# Patient Record
Sex: FEMALE | Race: WHITE | NOT HISPANIC OR LATINO | Employment: FULL TIME | URBAN - METROPOLITAN AREA
[De-identification: names, ages, dates, MRNs, and addresses within clinical notes are randomized per-mention and may not be internally consistent; named-entity substitution may affect disease eponyms.]

---

## 2017-06-06 PROBLEM — Q51.810 ARCUATE UTERUS: Status: ACTIVE | Noted: 2017-06-06

## 2017-12-15 ENCOUNTER — APPOINTMENT (OUTPATIENT)
Dept: CT IMAGING | Facility: HOSPITAL | Age: 37
End: 2017-12-15

## 2017-12-15 ENCOUNTER — HOSPITAL ENCOUNTER (EMERGENCY)
Facility: HOSPITAL | Age: 37
Discharge: HOME | End: 2017-12-15
Attending: EMERGENCY MEDICINE | Admitting: NURSE PRACTITIONER

## 2017-12-15 VITALS
TEMPERATURE: 97.4 F | WEIGHT: 101 LBS | HEART RATE: 79 BPM | HEIGHT: 65 IN | OXYGEN SATURATION: 96 % | BODY MASS INDEX: 16.83 KG/M2 | SYSTOLIC BLOOD PRESSURE: 143 MMHG | RESPIRATION RATE: 16 BRPM | DIASTOLIC BLOOD PRESSURE: 87 MMHG

## 2017-12-15 DIAGNOSIS — N83.202 CYST OF LEFT OVARY: Primary | ICD-10-CM

## 2017-12-15 DIAGNOSIS — R19.00 PELVIC MASS: ICD-10-CM

## 2017-12-15 DIAGNOSIS — R10.9 ABDOMINAL PAIN, UNSPECIFIED ABDOMINAL LOCATION: ICD-10-CM

## 2017-12-15 LAB
ALBUMIN SERPL-MCNC: 4.1 G/DL (ref 3.2–4.8)
ALBUMIN/GLOB SERPL: 1.5 G/DL (ref 1.5–2.5)
ALP SERPL-CCNC: 64 U/L (ref 25–100)
ALT SERPL W P-5'-P-CCNC: 25 U/L (ref 7–40)
ANION GAP SERPL CALCULATED.3IONS-SCNC: 1 MMOL/L (ref 3–11)
AST SERPL-CCNC: 30 U/L (ref 0–33)
B-HCG UR QL: NEGATIVE
BASOPHILS # BLD AUTO: 0.02 10*3/MM3 (ref 0–0.2)
BASOPHILS NFR BLD AUTO: 0.3 % (ref 0–1)
BILIRUB SERPL-MCNC: 0.7 MG/DL (ref 0.3–1.2)
BILIRUB UR QL STRIP: NEGATIVE
BUN BLD-MCNC: 14 MG/DL (ref 9–23)
BUN/CREAT SERPL: 17.5 (ref 7–25)
CALCIUM SPEC-SCNC: 9.2 MG/DL (ref 8.7–10.4)
CHLORIDE SERPL-SCNC: 111 MMOL/L (ref 99–109)
CLARITY UR: CLEAR
CO2 SERPL-SCNC: 27 MMOL/L (ref 20–31)
COLOR UR: YELLOW
CREAT BLD-MCNC: 0.8 MG/DL (ref 0.6–1.3)
DEPRECATED RDW RBC AUTO: 44.2 FL (ref 37–54)
EOSINOPHIL # BLD AUTO: 0.05 10*3/MM3 (ref 0–0.3)
EOSINOPHIL NFR BLD AUTO: 0.7 % (ref 0–3)
ERYTHROCYTE [DISTWIDTH] IN BLOOD BY AUTOMATED COUNT: 12.7 % (ref 11.3–14.5)
GFR SERPL CREATININE-BSD FRML MDRD: 81 ML/MIN/1.73
GLOBULIN UR ELPH-MCNC: 2.7 GM/DL
GLUCOSE BLD-MCNC: 94 MG/DL (ref 70–100)
GLUCOSE UR STRIP-MCNC: NEGATIVE MG/DL
HCT VFR BLD AUTO: 40.2 % (ref 34.5–44)
HGB BLD-MCNC: 13.2 G/DL (ref 11.5–15.5)
HGB UR QL STRIP.AUTO: NEGATIVE
IMM GRANULOCYTES # BLD: 0.01 10*3/MM3 (ref 0–0.03)
IMM GRANULOCYTES NFR BLD: 0.1 % (ref 0–0.6)
KETONES UR QL STRIP: NEGATIVE
LEUKOCYTE ESTERASE UR QL STRIP.AUTO: NEGATIVE
LIPASE SERPL-CCNC: 52 U/L (ref 6–51)
LYMPHOCYTES # BLD AUTO: 1.2 10*3/MM3 (ref 0.6–4.8)
LYMPHOCYTES NFR BLD AUTO: 17.4 % (ref 24–44)
MCH RBC QN AUTO: 31.6 PG (ref 27–31)
MCHC RBC AUTO-ENTMCNC: 32.8 G/DL (ref 32–36)
MCV RBC AUTO: 96.2 FL (ref 80–99)
MONOCYTES # BLD AUTO: 0.71 10*3/MM3 (ref 0–1)
MONOCYTES NFR BLD AUTO: 10.3 % (ref 0–12)
NEUTROPHILS # BLD AUTO: 4.89 10*3/MM3 (ref 1.5–8.3)
NEUTROPHILS NFR BLD AUTO: 71.2 % (ref 41–71)
NITRITE UR QL STRIP: NEGATIVE
PH UR STRIP.AUTO: 8.5 [PH] (ref 5–8)
PLATELET # BLD AUTO: 148 10*3/MM3 (ref 150–450)
PMV BLD AUTO: 11.7 FL (ref 6–12)
POTASSIUM BLD-SCNC: 4.3 MMOL/L (ref 3.5–5.5)
PROT SERPL-MCNC: 6.8 G/DL (ref 5.7–8.2)
PROT UR QL STRIP: NEGATIVE
RBC # BLD AUTO: 4.18 10*6/MM3 (ref 3.89–5.14)
SODIUM BLD-SCNC: 139 MMOL/L (ref 132–146)
SP GR UR STRIP: 1.01 (ref 1–1.03)
UROBILINOGEN UR QL STRIP: ABNORMAL
WBC NRBC COR # BLD: 6.88 10*3/MM3 (ref 3.5–10.8)

## 2017-12-15 PROCEDURE — 81003 URINALYSIS AUTO W/O SCOPE: CPT

## 2017-12-15 PROCEDURE — 96374 THER/PROPH/DIAG INJ IV PUSH: CPT

## 2017-12-15 PROCEDURE — 81025 URINE PREGNANCY TEST: CPT | Performed by: EMERGENCY MEDICINE

## 2017-12-15 PROCEDURE — 99284 EMERGENCY DEPT VISIT MOD MDM: CPT

## 2017-12-15 PROCEDURE — 96375 TX/PRO/DX INJ NEW DRUG ADDON: CPT

## 2017-12-15 PROCEDURE — 25010000002 ONDANSETRON PER 1 MG: Performed by: NURSE PRACTITIONER

## 2017-12-15 PROCEDURE — 0 DIATRIZOATE MEGLUMINE & SODIUM PER 1 ML: Performed by: NURSE PRACTITIONER

## 2017-12-15 PROCEDURE — 0 IOPAMIDOL 61 % SOLUTION: Performed by: EMERGENCY MEDICINE

## 2017-12-15 PROCEDURE — 51798 US URINE CAPACITY MEASURE: CPT

## 2017-12-15 PROCEDURE — 96361 HYDRATE IV INFUSION ADD-ON: CPT

## 2017-12-15 PROCEDURE — 80053 COMPREHEN METABOLIC PANEL: CPT | Performed by: NURSE PRACTITIONER

## 2017-12-15 PROCEDURE — 83690 ASSAY OF LIPASE: CPT | Performed by: NURSE PRACTITIONER

## 2017-12-15 PROCEDURE — 74177 CT ABD & PELVIS W/CONTRAST: CPT

## 2017-12-15 PROCEDURE — 25010000002 KETOROLAC TROMETHAMINE PER 15 MG: Performed by: NURSE PRACTITIONER

## 2017-12-15 PROCEDURE — 85025 COMPLETE CBC W/AUTO DIFF WBC: CPT | Performed by: NURSE PRACTITIONER

## 2017-12-15 RX ORDER — ONDANSETRON 2 MG/ML
4 INJECTION INTRAMUSCULAR; INTRAVENOUS ONCE
Status: COMPLETED | OUTPATIENT
Start: 2017-12-15 | End: 2017-12-15

## 2017-12-15 RX ORDER — HYDROCODONE BITARTRATE AND ACETAMINOPHEN 5; 325 MG/1; MG/1
1-2 TABLET ORAL EVERY 6 HOURS PRN
Qty: 15 TABLET | Refills: 0 | Status: SHIPPED | OUTPATIENT
Start: 2017-12-15 | End: 2018-01-05

## 2017-12-15 RX ORDER — KETOROLAC TROMETHAMINE 15 MG/ML
15 INJECTION, SOLUTION INTRAMUSCULAR; INTRAVENOUS ONCE
Status: COMPLETED | OUTPATIENT
Start: 2017-12-15 | End: 2017-12-15

## 2017-12-15 RX ORDER — SODIUM CHLORIDE 0.9 % (FLUSH) 0.9 %
10 SYRINGE (ML) INJECTION AS NEEDED
Status: DISCONTINUED | OUTPATIENT
Start: 2017-12-15 | End: 2017-12-15 | Stop reason: HOSPADM

## 2017-12-15 RX ORDER — ONDANSETRON 4 MG/1
4 TABLET, FILM COATED ORAL EVERY 6 HOURS PRN
Qty: 12 TABLET | Refills: 0 | Status: SHIPPED | OUTPATIENT
Start: 2017-12-15 | End: 2018-01-05

## 2017-12-15 RX ADMIN — Medication 10 ML: at 11:05

## 2017-12-15 RX ADMIN — SODIUM CHLORIDE 1000 ML: 9 INJECTION, SOLUTION INTRAVENOUS at 11:17

## 2017-12-15 RX ADMIN — DIATRIZOATE MEGLUMINE AND DIATRIZOATE SODIUM 15 ML: 660; 100 LIQUID ORAL; RECTAL at 11:17

## 2017-12-15 RX ADMIN — KETOROLAC TROMETHAMINE 15 MG: 15 INJECTION, SOLUTION INTRAMUSCULAR; INTRAVENOUS at 11:18

## 2017-12-15 RX ADMIN — ONDANSETRON 4 MG: 2 INJECTION INTRAMUSCULAR; INTRAVENOUS at 11:18

## 2017-12-15 RX ADMIN — IOPAMIDOL 80 ML: 612 INJECTION, SOLUTION INTRAVENOUS at 12:53

## 2017-12-15 NOTE — ED PROVIDER NOTES
Subjective   HPI Comments: Lower abd pain x 3 days.    Hx of ovarian cysts 5 years ago.    Has had some nausea and vomiting.     Has been waxing and waning.    Patient is a 37 y.o. female presenting with abdominal pain.   Abdominal Pain   Pain location:  Suprapubic  Pain quality: aching    Pain radiates to:  Does not radiate  Pain severity:  Moderate  Onset quality:  Gradual  Duration:  3 days  Timing:  Constant  Progression:  Waxing and waning  Chronicity:  New  Relieved by:  Nothing  Worsened by:  Position changes  Ineffective treatments:  None tried  Associated symptoms: nausea and vomiting    Associated symptoms: no chest pain, no diarrhea and no vaginal bleeding        Review of Systems   Cardiovascular: Negative for chest pain.   Gastrointestinal: Positive for abdominal pain, nausea and vomiting. Negative for diarrhea.   Genitourinary: Negative for vaginal bleeding.   All other systems reviewed and are negative.      Past Medical History:   Diagnosis Date   • Arcuate uterus        No Known Allergies    History reviewed. No pertinent surgical history.    History reviewed. No pertinent family history.    Social History     Social History   • Marital status: Single     Spouse name: N/A   • Number of children: N/A   • Years of education: N/A     Social History Main Topics   • Smoking status: Current Every Day Smoker     Packs/day: 0.50   • Smokeless tobacco: None   • Alcohol use No   • Drug use: No   • Sexual activity: Not Asked     Other Topics Concern   • None     Social History Narrative   • None           Objective   Physical Exam   Constitutional: She is oriented to person, place, and time. She appears well-developed and well-nourished.   HENT:   Head: Normocephalic and atraumatic.   Right Ear: External ear normal.   Left Ear: External ear normal.   Nose: Nose normal.   Mouth/Throat: Oropharynx is clear and moist.   Eyes: Conjunctivae and EOM are normal. Pupils are equal, round, and reactive to light.   Neck:  Normal range of motion. Neck supple.   Cardiovascular: Normal rate, regular rhythm, normal heart sounds and intact distal pulses.    Pulmonary/Chest: Effort normal and breath sounds normal.   Abdominal: Soft. Bowel sounds are normal. There is tenderness.   Musculoskeletal: Normal range of motion.   Neurological: She is alert and oriented to person, place, and time.   Skin: Skin is warm and dry.   Psychiatric: She has a normal mood and affect. Her behavior is normal. Judgment and thought content normal.       Procedures         ED Course  ED Course   Comment By Time   I spoke with Dr. Manning and he reviewed todays CT and his records. Will see her in the clinic.     Pt aware of the importance of fu. All thankful and agree. She will see this week.    Bladder large on the CT post void about 200ml. Pain much better after tordol. Aleksey Pearson, OMER 12/15 1459                  [facility]    Final diagnoses:   Cyst of left ovary   Abdominal pain, unspecified abdominal location   Pelvic mass            Aleksey Pearson, OMER  12/15/17 [id number]

## 2017-12-19 ENCOUNTER — TELEPHONE (OUTPATIENT)
Dept: OBSTETRICS AND GYNECOLOGY | Facility: CLINIC | Age: 37
End: 2017-12-19

## 2017-12-19 NOTE — TELEPHONE ENCOUNTER
Patient was seen in hospital for a cyst and they advised should see her doctor and have taken care of ?  Patient isn't sure what they are asking her to do.    Please call her and see what needs to be done - patient is in pain and wants to know.

## 2017-12-20 ENCOUNTER — TELEPHONE (OUTPATIENT)
Dept: OBSTETRICS AND GYNECOLOGY | Facility: CLINIC | Age: 37
End: 2017-12-20

## 2017-12-20 NOTE — TELEPHONE ENCOUNTER
Dr Floyd Patient    Is calling to talk to a nurse about the cyst on left in pelvic size 9cm like half her stomach.. She was ask to get it drained as soon as possible.

## 2017-12-20 NOTE — TELEPHONE ENCOUNTER
Pt was needing to get in to be seen soon as possible, explain to Pt it would be best to see another provider to get her in soon, due to Dr.Yo yañez being out and full schedule for up coming week. Pt call was forward to the front

## 2017-12-27 ENCOUNTER — TELEPHONE (OUTPATIENT)
Dept: OBSTETRICS AND GYNECOLOGY | Facility: CLINIC | Age: 37
End: 2017-12-27

## 2017-12-30 PROBLEM — Z01.419 WELL WOMAN EXAM: Status: ACTIVE | Noted: 2017-12-30

## 2018-01-05 ENCOUNTER — TELEPHONE (OUTPATIENT)
Dept: OBSTETRICS AND GYNECOLOGY | Facility: CLINIC | Age: 38
End: 2018-01-05

## 2018-01-05 ENCOUNTER — OFFICE VISIT (OUTPATIENT)
Dept: OBSTETRICS AND GYNECOLOGY | Facility: CLINIC | Age: 38
End: 2018-01-05

## 2018-01-05 VITALS
DIASTOLIC BLOOD PRESSURE: 72 MMHG | WEIGHT: 107 LBS | RESPIRATION RATE: 14 BRPM | BODY MASS INDEX: 17.81 KG/M2 | SYSTOLIC BLOOD PRESSURE: 116 MMHG

## 2018-01-05 DIAGNOSIS — N83.202 CYST OF LEFT OVARY: Primary | ICD-10-CM

## 2018-01-05 PROCEDURE — 99204 OFFICE O/P NEW MOD 45 MIN: CPT | Performed by: OBSTETRICS & GYNECOLOGY

## 2018-01-05 NOTE — PROGRESS NOTES
Subjective   Chief Complaint   Patient presents with   • Ovarian Cyst     f/u from Er visit     Pinky Dykes is a 37 y.o. year old .  Patient's last menstrual period was 2017 (approximate).  She presents to be seen because of Left lower quadrant pain worse for the last 4-6 weeks.  She has a long-standing history of a left ovarian cyst.  The pain can last all day.  The pain can radiate into the back at times.  It does not radiate into the vagina or the leg.  She takes at ×800 mg of ibuprofen every 4 hours to help with the pain.  Heating pad does tend to help the pain somewhat.  There has been associated chills at times.  She does have some nausea and vomiting at times.  Constipation is not an issue.  She's not been sexually active for several months.  Urinary symptoms are absent.    Activity makes the pain worse.  Resting helps it.  It is beginning to affect her ability to lift while at work.    She was seen at [facility] in the emergency room where a CAT scan was done.  The CAT scan demonstrated about a 9 cm left adnexal cyst    Over the last 6 months her cycles have been reasonably predictable.  She occasionally will bleed earlier than anticipated.  Cycles last about 7 days.  Flow can be heavy at times.    OTHER THINGS SHE WANTS TO DISCUSS TODAY:  Nothing else    The following portions of the patient's history were reviewed and updated as appropriate:current medications, allergies, past family history, past medical history, past social history and past surgical history    Smoking status: Current Every Day Smoker                                                   Packs/day: 0.50      Years: 0.00      Smokeless status: Never Used                        Review of Systems  Constitutional POS: nothing reported    NEG: anorexia or night sweats   Genitourinary POS: see HPI    NEG: dysuria or hematuria   Gastointestinal POS: see HPI    NEG: bloating, change in bowel habits, melena or reflux symptoms   Integument  POS: nothing reported    NEG: moles that are changing in size, shape, color or rashes   Breast POS: nothing reported    NEG: persistent breast lump, skin dimpling or nipple discharge         Objective   /72  Resp 14  Wt 48.5 kg (107 lb)  LMP 01/27/2017 (Approximate)  Breastfeeding? No  BMI 17.81 kg/m2    General:  well developed; well nourished  no acute distress   Abdomen: soft, non-tender; no masses  no umbilical or inginual hernias are present  no hepato-splenomegaly  non-surgical   Pelvis: Clinical staff was present for exam  External genitalia:  normal appearance of the external genitalia including Bartholin's and [place]'s glands.  :  urethral meatus normal;  Vaginal:  normal pink mucosa without prolapse or lesions.  Cervix:  normal appearance.  Uterus:  normal size, shape and consistency.  Adnexa:  Large left adnexal mass filling the pelvis displacing the uterus to the right  Rectal:  digital rectal exam not performed; anus visually normal appearing.     Lab Review   No data reviewed    Imaging   CT of abdomen/pelvis report        Assessment   1. Persistent large left adnexal cyst historically with benign sonographic appearance.  It is impacting her quality of life and additional workup is required     Plan   1. Ultrasound needs to be done ASAP.   2. The importance of keeping all planned follow-up and taking all medications as prescribed was emphasized.  3. Follow up for ultrasound to discuss treatment options.  Anticipate this is going to need to be removed surgically.           This note was electronically signed.    Messi Floyd M.D.  January 5, 2018    Note: Speech recognition transcription software may have been used to create portions of this document.  An attempt at proofreading has been made but errors in transcription could still be present.

## 2018-01-05 NOTE — TELEPHONE ENCOUNTER
Pt was wondering if advil was okay to take for her pain, and I assured her the advil was good to take. Pt also wanted to know would  see her after her U/S, I told Pt to wait and see what was the U/s founding on  Monday.

## 2018-01-05 NOTE — TELEPHONE ENCOUNTER
Dr Floyd Patient is calling says she was in here today but has a couple of questions to ask the nurse.

## 2018-01-08 ENCOUNTER — OFFICE VISIT (OUTPATIENT)
Dept: OBSTETRICS AND GYNECOLOGY | Facility: CLINIC | Age: 38
End: 2018-01-08

## 2018-01-08 VITALS — BODY MASS INDEX: 18.3 KG/M2 | WEIGHT: 110 LBS | RESPIRATION RATE: 14 BRPM

## 2018-01-08 DIAGNOSIS — N83.202 CYST OF LEFT OVARY: Primary | ICD-10-CM

## 2018-01-08 PROCEDURE — 99214 OFFICE O/P EST MOD 30 MIN: CPT | Performed by: OBSTETRICS & GYNECOLOGY

## 2018-01-08 NOTE — PROGRESS NOTES
"Subjective   Chief Complaint   Patient presents with   • Imaging Only     Pinky Dykes is a 37 y.o. year old .  No LMP recorded.  She presents to be seen because of ***    The following portions of the patient's history were reviewed and updated as appropriate:{Misc - hx reviewed prob foc visit:91436::\"current medications\",\"allergies\"}    Smoking status: Former Smoker                                                              Packs/day: 0.50      Years: 0.00         Types: Cigarettes     Start date:      Quit date: 2017  Smokeless status: Never Used                             Objective   Resp 14  Wt 49.9 kg (110 lb)  Breastfeeding? No  BMI 18.3 kg/m2    Lab Review   {Review - gyn labs:03284::\"No data reviewed\"}    Imaging   {Review - imagin::\"No data reviewed\"}        Assessment   1. ***     Plan   1. ***  2. The importance of keeping all planned follow-up and taking all medications as prescribed was emphasized.  3. Follow up {F/U reason:43709::\"for annual exam\"} *** {follow-up time:}    No orders of the defined types were placed in this encounter.         This note was electronically signed.    Messi Floyd M.D.  2018    Total time spent today with Pinky  was {Time spent:19673}.  Off this time, {Percentage:09817::\"> 50%\"} was spent face-to-face time coordinating care, answering her questions and counseling regarding {Counseling topics:85311::\"pathophysiology of her presenting problem along with plans for any diagnositc work-up and treatment\"}.    Note: Speech recognition transcription software may have been used to create portions of this document.  An attempt at proofreading has been made but errors in transcription could still be present.  "

## 2018-01-08 NOTE — PROGRESS NOTES
Subjective   Chief Complaint   Patient presents with   • Imaging Only     Pinky Dykes is a 37 y.o. year old  who comes to review her recent testing and discuss next steps.    OTHER THINGS SHE WANTS TO DISCUSS TODAY:  Nothing else       Objective   Resp 14  Wt 49.9 kg (110 lb)  Breastfeeding? No  BMI 18.3 kg/m2    Lab Review   No data reviewed    Imaging   Pelvic ultrasound images independantly reviewed - Large simple left ovarian cyst       Assessment   1. Pelvic pain with large simple left ovarian cyst.  Findings are most consistent with an ovarian serous cystadenoma.     Plan   1. Diagnostic laparoscopy with left ovarian cystectomy and probable left salpingo-oophorectomy  2. Today I discussed with Pinky the risks of her upcoming surgical procedure. Risks including intraoperative bleeding, infection at the site of surgery, damage to the adjacent surrounding organs, catheter induced urinary tract infections and the small risk for deep vein thrombosis were all explained. Additionally, the small risk for reoperation in the event of unanticipated bleeding or surgical injury was discussed.  All of her questions were answered fully.  She left with a very clear understanding of the preoperative surgical indications and the nature of the surgery for which she is scheduled.  She understands to be NPO after midnight and to be at the preoperative area at least 1-1/2 hours prior to the scheduled surgical start time.         Total time spent today with Pinky  was 30 minutes (level 4).  Greater than 50% of the time was spent coordinating care, answering her questions and counseling regarding pathophysiology of her presenting problem along with plans for any diagnositc work-up and treatment.    This note was electronically signed.    Messi Floyd M.D.  2018    Note: Speech recognition transcription software may have been used to create portions of this document.  An attempt at proofreading has been  made but errors in transcription could still be present.

## 2018-01-12 ENCOUNTER — PREP FOR SURGERY (OUTPATIENT)
Dept: OTHER | Facility: HOSPITAL | Age: 38
End: 2018-01-12

## 2018-01-13 NOTE — H&P
Pinky Dykes  : 1980  MRN: [id number]  [facility]: [id number]    History and Physical    Subjective   Pinky Dykes is a 37 y.o. year old  who present for surgery due to left-sided pelvic pain and persistent and expanding large left ovarian simple cyst.  It is been present for greater than 3 years time.    Past Medical History:   Diagnosis Date   • Left ovarian cyst 2014     Past Surgical History:   Procedure Laterality Date   • BUNIONECTOMY Right      Obstetric History       T0      L0     SAB0   TAB0   Ectopic0   Multiple0   Live Births0         Smoking status: Former Smoker                                                              Packs/day: 0.50      Years: 0.00         Types: Cigarettes     Start date:      Quit date: 2017  Smokeless status: Never Used                        No current outpatient prescriptions on file.    No Known Allergies    Review of Systems   Constitutional: Negative for chills, fever and unexpected weight change.   HENT: Negative for ear pain, facial swelling, sinus pressure, sneezing and sore throat.    Respiratory: Negative for cough, shortness of breath and wheezing.    Cardiovascular: Negative for chest pain and palpitations.   Hematological: Does not bruise/bleed easily.         Objective     Vital Signs: See nursing documentation   General: well developed; well nourished  no acute distress   Mental status: Alert and oriented   Heart: Not performed.   Lungs: breathing is unlabored   Abdomen: soft, non-tender; no masses  no umbilical or inginual hernias are present  no hepato-splenomegaly   Pelvis: Not performed.   Labs  Lab Results   Component Value Date     (L) 12/15/2017    HGB 13.2 12/15/2017    HCT 40.2 12/15/2017    WBC 6.88 12/15/2017     12/15/2017    K 4.3 12/15/2017     (H) 12/15/2017    CO2 27.0 12/15/2017    BUN 14 12/15/2017    CREATININE 0.80 12/15/2017    GLUCOSE 94 12/15/2017    ALBUMIN 4.10 12/15/2017     CALCIUM 9.2 12/15/2017    AST 30 12/15/2017    ALT 25 12/15/2017    BILITOT 0.7 12/15/2017        Assessment   1. Persistent, sonographically benign-appearing left ovarian cyst most consistent with ovarian cystadenoma  2. Left lower quadrant pain most consistent with ovarian cystic structure     Plan   1. Diagnostic laparoscopy with possible ovarian cystectomy, probable left salpingo-oophorectomy  2. I have previously discussed with Pinky the risks of her surgical procedure. Risks including intraoperative bleeding, infection at the site of surgery, damage to the adjacent surrounding organs, catheter induced urinary tract infections and the small risk for deep vein thrombosis have all been explained. Additionally, the small risk for reoperation in the event of unanticipated bleeding or surgical injury have been discussed.  All of her questions have been answered fully.        Messi Floyd MD       (Pt's PCP is Dottie Fortune MD)

## 2018-01-30 ENCOUNTER — LAB REQUISITION (OUTPATIENT)
Dept: LAB | Facility: HOSPITAL | Age: 38
End: 2018-01-30

## 2018-01-30 ENCOUNTER — OUTSIDE FACILITY SERVICE (OUTPATIENT)
Dept: OBSTETRICS AND GYNECOLOGY | Facility: CLINIC | Age: 38
End: 2018-01-30

## 2018-01-30 DIAGNOSIS — N83.202 CYST OF LEFT OVARY: ICD-10-CM

## 2018-01-30 PROCEDURE — 58661 LAPAROSCOPY REMOVE ADNEXA: CPT | Performed by: OBSTETRICS & GYNECOLOGY

## 2018-01-30 PROCEDURE — 88305 TISSUE EXAM BY PATHOLOGIST: CPT | Performed by: OBSTETRICS & GYNECOLOGY

## 2018-01-31 LAB
CYTO UR: NORMAL
LAB AP CASE REPORT: NORMAL
LAB AP CLINICAL INFORMATION: NORMAL
Lab: NORMAL
PATH REPORT.FINAL DX SPEC: NORMAL
PATH REPORT.GROSS SPEC: NORMAL

## 2018-02-05 ENCOUNTER — OFFICE VISIT (OUTPATIENT)
Dept: OBSTETRICS AND GYNECOLOGY | Facility: CLINIC | Age: 38
End: 2018-02-05

## 2018-02-05 VITALS
BODY MASS INDEX: 18.8 KG/M2 | SYSTOLIC BLOOD PRESSURE: 114 MMHG | RESPIRATION RATE: 14 BRPM | DIASTOLIC BLOOD PRESSURE: 68 MMHG | WEIGHT: 113 LBS

## 2018-02-05 DIAGNOSIS — N83.202 CYST OF LEFT OVARY: Primary | ICD-10-CM

## 2018-02-05 DIAGNOSIS — Z98.890 POST-OPERATIVE STATE: ICD-10-CM

## 2018-02-05 PROCEDURE — 99024 POSTOP FOLLOW-UP VISIT: CPT | Performed by: OBSTETRICS & GYNECOLOGY

## 2018-02-05 RX ORDER — IBUPROFEN 200 MG
200 TABLET ORAL EVERY 6 HOURS PRN
COMMUNITY

## 2018-02-05 NOTE — PROGRESS NOTES
Subjective   Chief Complaint   Patient presents with   • Post-op     Pinky Dykes is a 38 y.o. year old  presenting to be seen for her post-operative visit.  Currently she reports no problems with eating, bowel movements, voiding, or wound drainage and pain is well controlled.    The pathology results from her procedure are in Pinky's record and are benign.      OTHER THINGS SHE WANTS TO DISCUSS TODAY:  Nothing else    The following portions of the patient's history were reviewed and updated as appropriate:current medications and allergies       Objective   /68  Resp 14  Wt 51.3 kg (113 lb)  Breastfeeding? No  BMI 18.8 kg/m2    General:  well developed; well nourished  no acute distress   Abdomen: soft, non-tender; no masses  no umbilical or inginual hernias are present  no hepato-splenomegaly  incision is healing   Pelvis: Not performed.          Assessment   1. S/P laparoscopy and left oophorectomy     Plan   1. May return to full activity with no restrictions  2. The importance of keeping all planned follow-up and taking all medications as prescribed was emphasized.  3. Follow up for annual exam 3 months.         This note was electronically signed.    Messi Floyd M.D.  2018    Note: Speech recognition transcription software may have been used to create portions of this document.  An attempt at proofreading has been made but errors in transcription could still be present.

## 2018-05-07 ENCOUNTER — OFFICE VISIT (OUTPATIENT)
Dept: OBSTETRICS AND GYNECOLOGY | Facility: CLINIC | Age: 38
End: 2018-05-07

## 2018-05-07 VITALS
RESPIRATION RATE: 14 BRPM | WEIGHT: 117 LBS | BODY MASS INDEX: 19.47 KG/M2 | SYSTOLIC BLOOD PRESSURE: 106 MMHG | DIASTOLIC BLOOD PRESSURE: 64 MMHG

## 2018-05-07 DIAGNOSIS — Z01.419 WELL WOMAN EXAM: Primary | ICD-10-CM

## 2018-05-07 DIAGNOSIS — F41.1 GENERALIZED ANXIETY DISORDER: ICD-10-CM

## 2018-05-07 PROCEDURE — 99395 PREV VISIT EST AGE 18-39: CPT | Performed by: OBSTETRICS & GYNECOLOGY

## 2018-05-07 RX ORDER — MULTIVITAMIN
TABLET ORAL DAILY
COMMUNITY

## 2018-05-07 RX ORDER — ESCITALOPRAM OXALATE 10 MG/1
10 TABLET ORAL DAILY
Qty: 30 TABLET | Refills: 5 | Status: SHIPPED | OUTPATIENT
Start: 2018-05-07 | End: 2018-06-18 | Stop reason: SDUPTHER

## 2018-05-07 NOTE — PROGRESS NOTES
Subjective   Chief Complaint   Patient presents with   • Gynecologic Exam     Pinky Dykes is a 38 y.o. year old  presenting to be seen for her annual exam. She has noticed herself becoming more short tempered and anxious.  It is beginning to impact her work and her relationships and she like to talk about ways to make this better.  She has not had any thoughts of harming herself or others.    SEXUAL Hx:  She is not currently sexually active.  In the past year there she has not been sexually active.    Condoms are not needed because she is not sexually active.  She would not like to be screened for STD's at today's exam.  Current birth control method: abstinence.  She is happy with her current method of contraception and does not want to discuss alternative methods of contraception.  MENSTRUAL Hx:  Patient's last menstrual period was 2018 (approximate).  In the past 6 months her cycles have been regular, predictable and occur monthly.  Her menstrual flow is typically normal.   Each month on average there are roughly 0 day(s) of very heavy flow.    Intermenstrual bleeding is absent.    Post-coital bleeding is n/a.  Dysmenorrhea: none  PMS: moderate and affecting her activities of daily living  Her cycles are not a source of concern for her that she wishes to discuss today.  HEALTH Hx:  She exercises regularly: yes.  She wears her seat belt: yes.  She has concerns about domestic violence: no.  OTHER THINGS SHE WANTS TO DISCUSS TODAY:  Nothing else    The following portions of the patient's history were reviewed and updated as appropriate:problem list, current medications, allergies, past family history, past medical history, past social history and past surgical history.    Smoking status: Former Smoker                                                              Packs/day: 0.50      Years: 0.00         Types: Cigarettes     Start date:      Quit date: 2017  Smokeless tobacco: Never Used                         Review of Systems  Constitutional POS: nothing reported    NEG: anorexia or night sweats   Genitourinary POS: nothing reported    NEG: dysuria or hematuria      Gastointestinal POS: nothing reported    NEG: bloating, change in bowel habits, melena or reflux symptoms   Integument POS: nothing reported    NEG: moles that are changing in size, shape, color or rashes   Breast POS: nothing reported    NEG: persistent breast lump, skin dimpling or nipple discharge        Objective   /64   Resp 14   Wt 53.1 kg (117 lb)   LMP 04/20/2018 (Approximate)   Breastfeeding? No   BMI 19.47 kg/m²     General:  well developed; well nourished  no acute distress   Skin:  No suspicious lesions seen   Thyroid: normal to inspection and palpation   Breasts:  Examined in supine position  Symmetric without masses or skin dimpling  Nipples normal without inversion, lesions or discharge  There are no palpable axillary nodes   Abdomen: soft, non-tender; no masses  no umbilical or inginual hernias are present  no hepato-splenomegaly   Pelvis: Clinical staff was present for exam  External genitalia:  normal appearance of the external genitalia including Bartholin's and [place]'s glands.  :  urethral meatus normal;  Vaginal:  normal pink mucosa without prolapse or lesions.  Cervix:  normal appearance.  Uterus:  normal size, shape and consistency.  Adnexa:  non palpable bilaterally.  Rectal:  digital rectal exam not performed; anus visually normal appearing.        Assessment   1. Normal GYN exam S/P left S&O  2. Anxiety disorder impacting quality of life     Plan   1. Pap was done today.  If she does not receive the results of the Pap within 2 weeks  time, she was instructed to call to find out the results.  I explained to Pinky that the recommendations for Pap smear interval in a low risk patient's has lengthened to 3 years time.  I encouraged her to be seen yearly for a full physical exam including breast and pelvic exam  even during the off years when PAP's will not be performed.  2. Start anti-depressants.  A new prescription(s) was created today  3. The importance of keeping all planned follow-up and taking all medications as prescribed was emphasized.  4. Follow up for recheck of anxiety in 6 weeks    New Medications Ordered This Visit   Medications   • escitalopram (LEXAPRO) 10 MG tablet     Sig: Take 1 tablet by mouth Daily.     Dispense:  30 tablet     Refill:  5          This note was electronically signed.    Messi Floyd M.D.  May 7, 2018    Note: Speech recognition transcription software may have been used to create portions of this document.  An attempt at proofreading has been made but errors in transcription could still be present.

## 2018-06-18 ENCOUNTER — OFFICE VISIT (OUTPATIENT)
Dept: OBSTETRICS AND GYNECOLOGY | Facility: CLINIC | Age: 38
End: 2018-06-18

## 2018-06-18 VITALS — WEIGHT: 119 LBS | RESPIRATION RATE: 14 BRPM | BODY MASS INDEX: 19.8 KG/M2

## 2018-06-18 DIAGNOSIS — F41.1 GENERALIZED ANXIETY DISORDER: Primary | ICD-10-CM

## 2018-06-18 PROCEDURE — 99213 OFFICE O/P EST LOW 20 MIN: CPT | Performed by: OBSTETRICS & GYNECOLOGY

## 2018-06-18 RX ORDER — ESCITALOPRAM OXALATE 20 MG/1
20 TABLET ORAL DAILY
Qty: 30 TABLET | Refills: 5 | Status: SHIPPED | OUTPATIENT
Start: 2018-06-18 | End: 2019-06-18

## 2018-06-18 NOTE — PROGRESS NOTES
Subjective   Chief Complaint   Patient presents with   • Medication Reaction     medication f/u     Pinky Dykes is a 38 y.o. year old .  Patient's last menstrual period was 2018 (approximate).  She presents to be seen because of Follow-up on her Lexapro.  At 10 mg a didn't work quite as well as she wanted.  She bump the dose to 20 mg and she is doing great.  She has stopped drinking alcohol.  She is more focused in less stress.  She's having no side effects and is very happy.  She like to continue with the same dose.    The following portions of the patient's history were reviewed and updated as appropriate:current medications and allergies    Smoking status: Former Smoker                                                              Packs/day: 0.50      Years: 0.00         Types: Cigarettes     Start date:      Quit date: 2017  Smokeless tobacco: Never Used                             Objective   Resp 14   Wt 54 kg (119 lb)   LMP 2018 (Approximate)   Breastfeeding? No   BMI 19.80 kg/m²     Lab Review   No data reviewed    Imaging   No data reviewed        Assessment   1. Generalized anxiety disorder significantly improved on Lexapro 20 mg daily     Plan   1. The importance of keeping all planned follow-up and taking all medications as prescribed was emphasized.  2. Follow up for recheck of Anxiety 4 months    New Medications Ordered This Visit   Medications   • escitalopram (LEXAPRO) 20 MG tablet     Sig: Take 1 tablet by mouth Daily.     Dispense:  30 tablet     Refill:  5          This note was electronically signed.    Messi Floyd M.D.  2018    Total time spent today with Pinky  was 20 minutes (level 3).  Off this time, 90% was spent face-to-face time coordinating care, answering her questions and counseling regarding pathophysiology of her presenting problem along with plans for any diagnositc work-up and treatment.    Note: Speech recognition transcription  software may have been used to create portions of this document.  An attempt at proofreading has been made but errors in transcription could still be present.

## 2024-07-18 NOTE — TELEPHONE ENCOUNTER
Dr Floyd Patient    Is wanting the nurse to give her a call, she states that the 9 cm cyst is hurting a lot and in pain, she has an appointment 1/5 but is wanting to come in sooner.  
Patient will keep appointment on 1/5/18, no openings.  
Alexia Adorno(Attending)